# Patient Record
Sex: MALE | Race: WHITE | NOT HISPANIC OR LATINO | Employment: FULL TIME | URBAN - METROPOLITAN AREA
[De-identification: names, ages, dates, MRNs, and addresses within clinical notes are randomized per-mention and may not be internally consistent; named-entity substitution may affect disease eponyms.]

---

## 2023-03-05 ENCOUNTER — APPOINTMENT (OUTPATIENT)
Dept: RADIOLOGY | Facility: CLINIC | Age: 63
End: 2023-03-05

## 2023-03-05 ENCOUNTER — OFFICE VISIT (OUTPATIENT)
Dept: URGENT CARE | Facility: CLINIC | Age: 63
End: 2023-03-05

## 2023-03-05 VITALS
TEMPERATURE: 98.7 F | SYSTOLIC BLOOD PRESSURE: 151 MMHG | OXYGEN SATURATION: 98 % | BODY MASS INDEX: 24.38 KG/M2 | WEIGHT: 180 LBS | HEART RATE: 55 BPM | DIASTOLIC BLOOD PRESSURE: 77 MMHG | HEIGHT: 72 IN

## 2023-03-05 DIAGNOSIS — S49.91XA INJURY OF RIGHT SHOULDER, INITIAL ENCOUNTER: ICD-10-CM

## 2023-03-05 DIAGNOSIS — S49.91XA INJURY OF RIGHT SHOULDER, INITIAL ENCOUNTER: Primary | ICD-10-CM

## 2023-03-05 DIAGNOSIS — S42.024A CLOSED NONDISPLACED FRACTURE OF SHAFT OF RIGHT CLAVICLE, INITIAL ENCOUNTER: ICD-10-CM

## 2023-03-05 RX ORDER — NAPROXEN 500 MG/1
500 TABLET ORAL 2 TIMES DAILY WITH MEALS
Qty: 20 TABLET | Refills: 0 | Status: SHIPPED | OUTPATIENT
Start: 2023-03-05

## 2023-03-05 RX ORDER — WARFARIN SODIUM 5 MG/1
TABLET ORAL
COMMUNITY
End: 2023-03-05

## 2023-03-05 RX ORDER — KETOROLAC TROMETHAMINE 30 MG/ML
30 INJECTION, SOLUTION INTRAMUSCULAR; INTRAVENOUS ONCE
Status: COMPLETED | OUTPATIENT
Start: 2023-03-05 | End: 2023-03-05

## 2023-03-05 RX ORDER — CHOLECALCIFEROL (VITAMIN D3) 125 MCG
CAPSULE ORAL
COMMUNITY

## 2023-03-05 RX ORDER — ATORVASTATIN CALCIUM 40 MG/1
40 TABLET, FILM COATED ORAL DAILY
COMMUNITY
Start: 2023-03-01

## 2023-03-05 RX ORDER — NEBIVOLOL 5 MG/1
TABLET ORAL
COMMUNITY
Start: 2023-03-01

## 2023-03-05 RX ORDER — ASPIRIN 81 MG/1
TABLET ORAL
COMMUNITY

## 2023-03-05 RX ORDER — SILDENAFIL 50 MG/1
50 TABLET, FILM COATED ORAL
COMMUNITY
Start: 2023-03-01

## 2023-03-05 RX ADMIN — KETOROLAC TROMETHAMINE 30 MG: 30 INJECTION, SOLUTION INTRAMUSCULAR; INTRAVENOUS at 09:26

## 2023-03-05 NOTE — PATIENT INSTRUCTIONS
Keep the sling in place  Apply ice for 20 minutes every 1-2 hours while awake  Take the naproxen as needed for pain and with food  Follow-up with the orthopedic

## 2023-03-05 NOTE — PROGRESS NOTES
[facility] [employer] [facility]        NAME: Rachel De La Vega is a 60 y o  male  : 1960    MRN: [id number]  DATE: 2023  TIME: 10:13 AM    Assessment and Plan   Injury of right shoulder, initial encounter [S49 [id number]]  1  Injury of right shoulder, initial encounter  XR shoulder 2+ vw right    ketorolac (TORADOL) injection 30 mg    naproxen (EC NAPROSYN) 500 MG EC tablet      2  Closed nondisplaced fracture of shaft of right clavicle, initial encounter  Ambulatory Referral to Orthopedic Surgery    Orthopedic injury treatment        X-ray interpreted by radiology: Acute nondisplaced fracture of the distal one 3rd of the right clavicleOrthopedic injury treatment    Date/Time: 3/5/2023 10:56 AM  Performed by: PROMISE Borja  Authorized by: PROMISE Borja     Patient Location:  Bedside    Injury location:  Sternoclavicular  Location details:  Right clavicle  Injury type:  Fracture  Neurovascular status: Neurovascularly intact    Distal perfusion: normal    Neurological function: normal    Range of motion: normal    Immobilization:  Sling  Neurovascular status: Neurovascularly intact    Distal perfusion: normal    Neurological function: normal    Range of motion: normal    Patient tolerance:  Patient tolerated the procedure well with no immediate complications          Patient Instructions     Keep the sling in place  Apply ice for 20 minutes every 1-2 hours while awake  Take the naproxen as needed for pain and with food  Follow-up with the orthopedic  Follow up with PCP in 3-5 days  Proceed to  ER if symptoms worsen  Chief Complaint     Chief Complaint   Patient presents with   • Shoulder Injury     Nitish Mcpherson on Right shoulder 2 days ago on a tree stump Previously had a surgery on this shoulder and a plate put in that shoulder          History of Present Illness       Patient is a 56YOM presenting with right shoulder pain after he fell onto a tree stump two days ago   He has a previous surgery and a plate put in on the right shoulder  Review of Systems   Review of Systems   Musculoskeletal: Positive for arthralgias and joint swelling  Current Medications       Current Outpatient Medications:   •  atorvastatin (LIPITOR) 40 mg tablet, Take 40 mg by mouth daily, Disp: , Rfl:   •  naproxen (EC NAPROSYN) 500 MG EC tablet, Take 1 tablet (500 mg total) by mouth 2 (two) times a day with meals, Disp: 20 tablet, Rfl: 0  •  nebivolol (Bystolic) 5 mg tablet, Take by mouth, Disp: , Rfl:   •  sildenafil (VIAGRA) 50 MG tablet, Take 50 mg by mouth, Disp: , Rfl:   •  aspirin (ECOTRIN LOW STRENGTH) 81 mg EC tablet, Take by mouth, Disp: , Rfl:   •  Cholecalciferol (D3 Maximum Strength) 125 MCG (5000 UT) capsule, Take by mouth, Disp: , Rfl:   •  Cholecalciferol 50 MCG (2000 UT) CAPS, Take 1 capsule by mouth 2 (two) times a day, Disp: , Rfl:     Current Allergies     Allergies as of 03/05/2023   • (No Known Allergies)            The following portions of the patient's history were reviewed and updated as appropriate: allergies, current medications, past family history, past medical history, past social history, past surgical history and problem list      History reviewed  No pertinent past medical history  History reviewed  No pertinent surgical history  History reviewed  No pertinent family history  Medications have been verified  Objective   /77   Pulse 55   Temp 98 7 °F (37 1 °C)   Ht 6' (1 829 m)   Wt 81 6 kg (180 lb)   SpO2 98%   BMI 24 41 kg/m²        Physical Exam     Physical Exam  Vitals and nursing note reviewed  Constitutional:       Appearance: Normal appearance  He is normal weight  HENT:      Mouth/Throat:      Pharynx: Oropharynx is clear  Cardiovascular:      Rate and Rhythm: Normal rate  Pulmonary:      Effort: Pulmonary effort is normal    Musculoskeletal:      Right shoulder: Swelling and tenderness (over the right clavicle) present  No deformity   Decreased range of motion  Neurological:      Mental Status: He is alert

## 2023-03-06 VITALS
SYSTOLIC BLOOD PRESSURE: 138 MMHG | HEIGHT: 72 IN | DIASTOLIC BLOOD PRESSURE: 80 MMHG | WEIGHT: 180 LBS | BODY MASS INDEX: 24.38 KG/M2

## 2023-03-06 DIAGNOSIS — S42.024A CLOSED NONDISPLACED FRACTURE OF SHAFT OF RIGHT CLAVICLE, INITIAL ENCOUNTER: ICD-10-CM

## 2023-03-06 DIAGNOSIS — S42.034A CLOSED NONDISPLACED FRACTURE OF ACROMIAL END OF RIGHT CLAVICLE, INITIAL ENCOUNTER: ICD-10-CM

## 2023-03-06 DIAGNOSIS — M25.511 ACUTE PAIN OF RIGHT SHOULDER: Primary | ICD-10-CM

## 2023-03-06 NOTE — LETTER
March 6, 2023     Patient: Bennett Martinez  YOB: 1960  Date of Visit: 3/6/2023      To Whom it May Concern:    Bennett Martinez is under my professional care  Yifan Hopson was seen in my office on 3/6/2023  He is not permitted to return to work at this time  He will be reevaluated in approximately 10 days  If you have any questions or concerns, please don't hesitate to call           Sincerely,          Jez Nava        CC: No Recipients

## 2023-03-06 NOTE — PROGRESS NOTES
ASSESSMENT/PLAN:    Diagnoses and all orders for this visit:    Acute pain of right shoulder    Closed nondisplaced fracture of acromial end of right clavicle, initial encounter        Plan: I would recommend follow-up in 10 days  X-rays of his clavicle will be obtained at follow-up  In the interim, he is to avoid use of the right upper extremity except for daily care activities and eating/drinking  He was given a note to remain out of work  The sling is to be dialyzed as needed  He is to contact me if questions or concerns arise  Return 10 days  _____________________________________________________  CHIEF COMPLAINT:  Chief Complaint   Patient presents with   • Right Shoulder - Pain         SUBJECTIVE:  Rambo Mccormick is a 60y o  year old right-handed male who presents for evaluation of his right shoulder injured approximately 4 5 days ago when he fell while walking his dog striking his right shoulder/clavicle on a tree root  He did not seek immediate medical care but with persistent symptoms was seen at the [facility] on 3/5/2023, x-rays were obtained and he was provided with a sling  He now presents for orthopedic evaluation and treatment  He previously suffered a clavicle fracture and underwent ORIF in 2013 without sequelae after healing  He denies any right upper extremity paresthesias  He denies any additional injuries  PAST MEDICAL HISTORY:  History reviewed  No pertinent past medical history  PAST SURGICAL HISTORY:  Past Surgical History:   Procedure Laterality Date   • SHOULDER ARTHROSCOPY Right        FAMILY HISTORY:  History reviewed  No pertinent family history  SOCIAL HISTORY:  Social History     Tobacco Use   • Smoking status: Never   • Smokeless tobacco: Never   Vaping Use   • Vaping Use: Never used   Substance Use Topics   • Alcohol use:  Yes     Alcohol/week: 1 0 standard drink     Types: 1 Cans of beer per week     Comment: special events   • Drug use: Never MEDICATIONS:    Current Outpatient Medications:   •  aspirin (ECOTRIN LOW STRENGTH) 81 mg EC tablet, Take by mouth, Disp: , Rfl:   •  atorvastatin (LIPITOR) 40 mg tablet, Take 40 mg by mouth daily, Disp: , Rfl:   •  naproxen (EC NAPROSYN) 500 MG EC tablet, Take 1 tablet (500 mg total) by mouth 2 (two) times a day with meals, Disp: 20 tablet, Rfl: 0  •  nebivolol (BYSTOLIC) 5 mg tablet, Take by mouth, Disp: , Rfl:   •  sildenafil (VIAGRA) 50 MG tablet, Take 50 mg by mouth, Disp: , Rfl:   •  Cholecalciferol (D3 Maximum Strength) 125 MCG (5000 UT) capsule, Take by mouth (Patient not taking: Reported on 3/6/2023), Disp: , Rfl:   •  Cholecalciferol 50 MCG (2000 UT) CAPS, Take 1 capsule by mouth 2 (two) times a day (Patient not taking: Reported on 3/6/2023), Disp: , Rfl:     ALLERGIES:  No Known Allergies    Review of systems:   Constitutional: Negative for fatigue, fever or loss of apetite  HENT: Negative  Respiratory: Negative for shortness of breath, dyspnea  Cardiovascular: Negative for chest pain/tightness  Gastrointestinal: Negative for abdominal pain, N/V  Endocrine: Negative for cold/heat intolerance, unexplained weight loss/gain  Genitourinary: Negative for flank pain, dysuria, hematuria  Musculoskeletal: Positive as in the HPI  Skin: Negative for rash  Neurological: Negative  Psychiatric/Behavioral: Negative for agitation  _____________________________________________________  PHYSICAL EXAMINATION:    Blood pressure 138/80, height 6' (1 829 m), weight 81 6 kg (180 lb)  General: well developed and well nourished, alert, oriented times 3 and appears comfortable  Psychiatric: Normal  HEENT: Benign  Cardiovascular: Regular    Pulmonary: No wheezing or stridor  Abdomen: Soft, Nontender  Skin: No masses, erythema, lacerations, fluctation, ulcerations  Neurovascular: Motor and sensory exams are intact and pulses palpable      MUSCULOSKELETAL EXAMINATION:    The right shoulder exam demonstrates a well-healed incision consistent with his past surgical history  There is mild swelling and ecchymosis noted  He has tenderness to palpation of the lateral clavicle  The proximal humerus is nontender  He has excellent range of motion of the shoulder with complaints of tightness and mild pain during abduction, moderate pain during forward flexion and moderate pain with adduction  The remainder of the upper extremity examination bilaterally is benign  _____________________________________________________  STUDIES REVIEWED:  X-rays demonstrate a nondisplaced fracture of the distal clavicle with an intact plate without evidence of hardware loosening or fracture extending to the hardware  The report was reviewed  The [facility] note was reviewed  PROCEDURES:  Fracture / Dislocation Treatment    Date/Time: 3/6/2023 1:29 PM  Performed by: Milena Rowan  Authorized by: Milena Rowan     Patient Location:  [facility] Protocol:  Consent: Verbal consent obtained  Written consent not obtained  Risks and benefits: risks, benefits and alternatives were discussed  Consent given by: patient  Patient understanding: patient states understanding of the procedure being performed  Test results: test results available and properly labeled  Radiology Images displayed and confirmed   If images not available, report reviewed: imaging studies available      Injury location:  Sternoclavicular  Location details:  Right clavicle  Injury type:  Fracture  Neurovascular status: Neurovascularly intact    Local anesthesia used?: No    Manipulation performed?: No    Immobilization:  Sling  Neurovascular status: Neurovascularly intact    Patient tolerance:  Patient tolerated the procedure well with no immediate complications          Milena Rowan

## 2023-03-17 ENCOUNTER — HOSPITAL ENCOUNTER (OUTPATIENT)
Dept: RADIOLOGY | Facility: CLINIC | Age: 63
End: 2023-03-17

## 2023-03-17 VITALS
HEART RATE: 59 BPM | SYSTOLIC BLOOD PRESSURE: 136 MMHG | TEMPERATURE: 97.7 F | DIASTOLIC BLOOD PRESSURE: 76 MMHG | BODY MASS INDEX: 24.38 KG/M2 | HEIGHT: 72 IN | WEIGHT: 180 LBS

## 2023-03-17 DIAGNOSIS — S42.034D CLOSED NONDISPLACED FRACTURE OF ACROMIAL END OF RIGHT CLAVICLE WITH ROUTINE HEALING, SUBSEQUENT ENCOUNTER: ICD-10-CM

## 2023-03-17 DIAGNOSIS — S42.034D CLOSED NONDISPLACED FRACTURE OF ACROMIAL END OF RIGHT CLAVICLE WITH ROUTINE HEALING, SUBSEQUENT ENCOUNTER: Primary | ICD-10-CM

## 2023-03-17 NOTE — PATIENT INSTRUCTIONS
Will have patient follow-up in orthopedics in 4 weeks with new x-rays right clavicle  Increase shoulder range of motion and function as tolerated  Allow pain to guide activity  No heavy lifting than 5 pounds with the right arm  Avoid lifting overhead

## 2023-03-17 NOTE — LETTER
March 17, 2023     Patient: Robson Jovel   YOB: 1960   Date of Visit: 3/17/2023       To Whom It May Concern: It is my medical opinion that Robson Jovel should remain out of work until His next orthopedic visit in approximately 4 weeks  Expected return to work date would be May 1, 2023  If you have any questions or concerns, please don't hesitate to call           Sincerely,        Karen Yeager    CC: No Recipients

## 2023-03-17 NOTE — PROGRESS NOTES
Patient Name:  Lia Carolina  MRN:  [id number]    Assessment     1  Closed nondisplaced fracture of acromial end of right clavicle with routine healing, subsequent encounter  XR clavicle right          Plan     1  Will have patient follow-up in orthopedics in 4 weeks with new x-rays right clavicle  Increase shoulder range of motion and function as tolerated  Allow pain to guide activity  No heavy lifting than 5 pounds with the right arm  Avoid lifting overhead  Return in about 4 weeks (around 4/14/2023) for Right clavicle x-ray  Subjective   Lia Carolnia returns for follow-up of dominant arm right distal clavicle fracture injury date 3/3/2023 after falling on a tree stump  He previously had ORIF midshaft clavicle in 2013 at [facility]  The patient is 2 week(s) post injury and returns for routine follow-up and x-ray  Patient complains of mild pain which is improving  The denies paresthesias  He notes increased range of motion of the shoulder and arm  Objective     /76   Pulse 59   Temp 97 7 °F (36 5 °C) (Temporal)   Ht 6' (1 829 m)   Wt 81 6 kg (180 lb)   BMI 24 41 kg/m²     Right shoulder resolving ecchymosis over the pectoralis and proximal humeral area  Well-healed previous ORIF incision  Tenderness just distally Doximity 1 finger breaths the end of the plate  Slight tenderness in this area without significant deformity  Flexion and abduction are 90 degrees  The arm abducted he has 70 degrees of external and 30 degrees of internal rotation  Data Review     I have personally reviewed pertinent films in PACS noting continued position alignment of distal clavicle fracture early signs of healing      Luther Manuel PA-C